# Patient Record
(demographics unavailable — no encounter records)

---

## 2025-04-17 NOTE — REASON FOR VISIT
[Initial Evaluation] : an initial evaluation [Thyroid nodule/ MNG] : thyroid nodule/ MNG [PCOS] : PCOS

## 2025-04-17 NOTE — HISTORY OF PRESENT ILLNESS
[Home] : at home, [unfilled] , at the time of the visit. [Other Location: e.g. Home (Enter Location, City,State)___] : at [unfilled] [Telehealth (audio & video)] : This visit was provided via telehealth using real-time 2-way audio visual technology. [Verbal consent obtained from patient] : the patient, [unfilled] [FreeTextEntry1] : Ms. Kaur is presenting for thyroid nodules and PCOS.   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   #Thyroid Nodules  -[place] [facility], had sonogram April 2025.  -Never had FNA  -No labs or imaging shown.   #PCOS  Was told she has elevated DHEAS level.  Has cysts on ovaries per GYN -No labs brought in  -LMP irregular

## 2025-04-17 NOTE — ASSESSMENT
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    1. Possible PCOS -Labs unknown   2. Thyroid nodules  -no imaging or labs brought in   Patient verbalized understanding of the above. All questions were answered to patient's satisfaction. Dispo: Patient will follow up next week with above imaging and labs.

## 2025-04-21 NOTE — ASSESSMENT
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    1. PCOS -Normal testosterone level, normal DHEAS level.  -We discussed the treatment options for PCOS. The first line treatments are exercise and weight loss, which help with restoring ovulatory cycles, hirsutism and possibly improves live birth rates. Next, combined OCPs can be used to manage hyperandrogenism, irregular periods and to provide contraception. If hirsutism persists after 6 months, spironolactone can be added on. -Patient should continue with her diet and exercise regimen as she has lost 100 pounds.   2. Thyroid nodules  -1.9cm colloid mixed nodule Jan 2025  -Repeat thyroid USG in 6 months.   Patient verbalized understanding of the above. All questions were answered to patient's satisfaction. Dispo: Patient will follow up in 6 months.

## 2025-04-21 NOTE — PHYSICAL EXAM
[Alert] : alert [Well Nourished] : well nourished [EOMI] : extra ocular movement intact [Normal Hearing] : hearing was normal [No Respiratory Distress] : no respiratory distress [Normal Rate] : heart rate was normal [Not Distended] : not distended [Normal Gait] : normal gait [No Tremors] : no tremors [Oriented x3] : oriented to person, place, and time [Normal Affect] : the affect was normal [Normal Mood] : the mood was normal